# Patient Record
Sex: MALE | Race: WHITE | URBAN - METROPOLITAN AREA
[De-identification: names, ages, dates, MRNs, and addresses within clinical notes are randomized per-mention and may not be internally consistent; named-entity substitution may affect disease eponyms.]

---

## 2017-12-15 ENCOUNTER — IMPORTED ENCOUNTER (OUTPATIENT)
Dept: URBAN - METROPOLITAN AREA CLINIC 50 | Facility: CLINIC | Age: 82
End: 2017-12-15

## 2017-12-19 ENCOUNTER — IMPORTED ENCOUNTER (OUTPATIENT)
Dept: URBAN - METROPOLITAN AREA CLINIC 50 | Facility: CLINIC | Age: 82
End: 2017-12-19

## 2018-12-14 ENCOUNTER — IMPORTED ENCOUNTER (OUTPATIENT)
Dept: URBAN - METROPOLITAN AREA CLINIC 50 | Facility: CLINIC | Age: 83
End: 2018-12-14

## 2019-05-17 ENCOUNTER — IMPORTED ENCOUNTER (OUTPATIENT)
Dept: URBAN - METROPOLITAN AREA CLINIC 50 | Facility: CLINIC | Age: 84
End: 2019-05-17

## 2019-12-20 ENCOUNTER — IMPORTED ENCOUNTER (OUTPATIENT)
Dept: URBAN - METROPOLITAN AREA CLINIC 50 | Facility: CLINIC | Age: 84
End: 2019-12-20

## 2019-12-20 NOTE — PATIENT DISCUSSION
"""Discussed he isn't too bothered by glare. Will monitor.  Recommend he get the glasses to drive. """

## 2020-12-18 ENCOUNTER — IMPORTED ENCOUNTER (OUTPATIENT)
Dept: URBAN - METROPOLITAN AREA CLINIC 50 | Facility: CLINIC | Age: 85
End: 2020-12-18

## 2021-04-17 ASSESSMENT — VISUAL ACUITY
OS_CC: J1@ 16 IN
OD_BAT: 20/30
OS_SC: 20/50-1
OS_BAT: 20/40
OD_OTHER: 20/30. 20/70.
OD_CC: J1@ 16 IN
OD_SC: 20/70
OS_OTHER: 20/30. 20/40.
OD_OTHER: 20/30-. 20/50-.
OD_SC: 20/70-
OD_PH: 20/40
OD_BAT: 20/30-
OD_OTHER: 20/30. 20/40.
OD_CC: J1+@ 20 IN
OD_OTHER: 20/30-. 20/80.
OD_PH: 20/30+2
OS_BAT: 20/70
OD_CC: 20/50
OS_CC: 20/30-2
OD_SC: 20/100
OD_BAT: 20/30-
OD_CC: 20/30-2
OS_CC: J1+@ 20 IN
OD_CC: J1+@ 17 IN
OS_SC: 20/25
OS_SC: 20/30+2
OS_CC: 20/30
OS_BAT: 20/30
OS_BAT: 20/30
OS_OTHER: 20/70. 20/400.
OS_PH: 20/30
OS_SC: 20/30
OD_BAT: 20/30
OS_CC: J1+@ 17 IN
OD_SC: 20/70
OD_PH: 20/30
OS_OTHER: 20/30. 20/30.

## 2021-04-17 ASSESSMENT — PACHYMETRY
OS_CT_UM: 558
OS_CT_UM: 558
OD_CT_UM: 550
OS_CT_UM: 558
OS_CT_UM: 558
OD_CT_UM: 550
OS_CT_UM: 558
OS_CT_UM: 558
OD_CT_UM: 550

## 2021-04-17 ASSESSMENT — TONOMETRY
OD_IOP_MMHG: 12
OD_IOP_MMHG: 14
OD_IOP_MMHG: 16
OS_IOP_MMHG: 11
OS_IOP_MMHG: 14
OS_IOP_MMHG: 14
OS_IOP_MMHG: 15
OS_IOP_MMHG: 12
OD_IOP_MMHG: 13
OS_IOP_MMHG: 12
OS_IOP_MMHG: 13

## 2022-01-06 ENCOUNTER — PREPPED CHART (OUTPATIENT)
Dept: URBAN - METROPOLITAN AREA CLINIC 52 | Facility: CLINIC | Age: 87
End: 2022-01-06

## 2022-01-07 ENCOUNTER — COMPREHENSIVE EXAM (OUTPATIENT)
Dept: URBAN - METROPOLITAN AREA CLINIC 52 | Facility: CLINIC | Age: 87
End: 2022-01-07

## 2022-01-07 DIAGNOSIS — H35.373: ICD-10-CM

## 2022-01-07 DIAGNOSIS — D31.32: ICD-10-CM

## 2022-01-07 DIAGNOSIS — H43.393: ICD-10-CM

## 2022-01-07 DIAGNOSIS — H25.13: ICD-10-CM

## 2022-01-07 PROCEDURE — 92015 DETERMINE REFRACTIVE STATE: CPT

## 2022-01-07 PROCEDURE — 92014 COMPRE OPH EXAM EST PT 1/>: CPT

## 2022-01-07 ASSESSMENT — VISUAL ACUITY
OU_CC: J2 @ 14IN
OS_CC: J2 @ 14IN
OS_PH: 20/30
OS_GLARE: 20/40
OD_GLARE: 20/25
OS_GLARE: 20/40
OS_CC: 20/40-1
OD_CC: J2 @ 14IN
OU_CC: 20/40-1
OD_CC: 20/50-1
OD_PH: 20/40
OD_GLARE: 20/25

## 2022-01-07 ASSESSMENT — TONOMETRY
OS_IOP_MMHG: 12
OS_IOP_MMHG: 11
OD_IOP_MMHG: 12
OD_IOP_MMHG: 12

## 2022-07-08 ENCOUNTER — ESTABLISHED PATIENT (OUTPATIENT)
Dept: URBAN - METROPOLITAN AREA CLINIC 52 | Facility: CLINIC | Age: 87
End: 2022-07-08

## 2022-07-08 DIAGNOSIS — D31.32: ICD-10-CM

## 2022-07-08 PROCEDURE — 92014 COMPRE OPH EXAM EST PT 1/>: CPT

## 2022-07-08 ASSESSMENT — TONOMETRY
OS_IOP_MMHG: 12
OD_IOP_MMHG: 12

## 2022-07-08 ASSESSMENT — VISUAL ACUITY
OS_CC: 20/30-2
OD_CC: 20/30-1

## 2022-07-08 NOTE — PATIENT DISCUSSION
Symptoms of Retinal tear and detachment reviewed. Patient understands to call immediately with any such symptoms. Ambulatory